# Patient Record
(demographics unavailable — no encounter records)

---

## 2024-12-19 NOTE — HISTORY OF PRESENT ILLNESS
[FreeTextEntry1] : 20 year old male to female transgender patient with a history of gender dysphoria. She is s/p 8/28/24 bilateral breast augmentation

## 2024-12-19 NOTE — ASSESSMENT
[FreeTextEntry1] : Incision sites at breasts healing well. Incision site intact and well approximated. No evidence of capsular contracture.  A/P; S/P bilateral breast augmentation for gender dysphoria Advised Silicone gel to scar site. Massage in 10 minutes daily with pressure x 6 weeks. Protect site from sun with zinc sunblock daily x 6 mths. Explained process of scar remodeling. Advised to follow up with primary care provider breast exams yearly or otherwise return to the office as needed.